# Patient Record
Sex: FEMALE | Race: WHITE | NOT HISPANIC OR LATINO | URBAN - METROPOLITAN AREA
[De-identification: names, ages, dates, MRNs, and addresses within clinical notes are randomized per-mention and may not be internally consistent; named-entity substitution may affect disease eponyms.]

---

## 2019-07-20 ENCOUNTER — EMERGENCY (EMERGENCY)
Facility: HOSPITAL | Age: 49
LOS: 1 days | Discharge: HOME | End: 2019-07-20
Attending: EMERGENCY MEDICINE | Admitting: EMERGENCY MEDICINE
Payer: COMMERCIAL

## 2019-07-20 VITALS
WEIGHT: 139.99 LBS | TEMPERATURE: 98 F | DIASTOLIC BLOOD PRESSURE: 125 MMHG | HEART RATE: 93 BPM | RESPIRATION RATE: 16 BRPM | SYSTOLIC BLOOD PRESSURE: 182 MMHG | OXYGEN SATURATION: 98 %

## 2019-07-20 VITALS
RESPIRATION RATE: 16 BRPM | HEART RATE: 84 BPM | SYSTOLIC BLOOD PRESSURE: 152 MMHG | DIASTOLIC BLOOD PRESSURE: 88 MMHG | TEMPERATURE: 98 F | OXYGEN SATURATION: 99 %

## 2019-07-20 LAB
ALBUMIN SERPL ELPH-MCNC: 5 G/DL — SIGNIFICANT CHANGE UP (ref 3.3–5)
ALP SERPL-CCNC: 28 U/L — LOW (ref 40–120)
ALT FLD-CCNC: 34 U/L — SIGNIFICANT CHANGE UP (ref 10–45)
ANION GAP SERPL CALC-SCNC: 12 MMOL/L — SIGNIFICANT CHANGE UP (ref 5–17)
APPEARANCE UR: CLEAR — SIGNIFICANT CHANGE UP
AST SERPL-CCNC: 27 U/L — SIGNIFICANT CHANGE UP (ref 10–40)
BASOPHILS # BLD AUTO: 0.05 K/UL — SIGNIFICANT CHANGE UP (ref 0–0.2)
BASOPHILS NFR BLD AUTO: 0.7 % — SIGNIFICANT CHANGE UP (ref 0–2)
BILIRUB SERPL-MCNC: 1 MG/DL — SIGNIFICANT CHANGE UP (ref 0.2–1.2)
BILIRUB UR-MCNC: NEGATIVE — SIGNIFICANT CHANGE UP
BUN SERPL-MCNC: 15 MG/DL — SIGNIFICANT CHANGE UP (ref 7–23)
CALCIUM SERPL-MCNC: 10.1 MG/DL — SIGNIFICANT CHANGE UP (ref 8.4–10.5)
CHLORIDE SERPL-SCNC: 100 MMOL/L — SIGNIFICANT CHANGE UP (ref 96–108)
CO2 SERPL-SCNC: 26 MMOL/L — SIGNIFICANT CHANGE UP (ref 22–31)
COLOR SPEC: YELLOW — SIGNIFICANT CHANGE UP
CREAT SERPL-MCNC: 0.7 MG/DL — SIGNIFICANT CHANGE UP (ref 0.5–1.3)
DIFF PNL FLD: NEGATIVE — SIGNIFICANT CHANGE UP
EOSINOPHIL # BLD AUTO: 0.24 K/UL — SIGNIFICANT CHANGE UP (ref 0–0.5)
EOSINOPHIL NFR BLD AUTO: 3.3 % — SIGNIFICANT CHANGE UP (ref 0–6)
EXTRA BLUE TOP TUBE: SIGNIFICANT CHANGE UP
EXTRA SST TUBE: SIGNIFICANT CHANGE UP
GLUCOSE SERPL-MCNC: 109 MG/DL — HIGH (ref 70–99)
GLUCOSE UR QL: NEGATIVE — SIGNIFICANT CHANGE UP
HCT VFR BLD CALC: 42.9 % — SIGNIFICANT CHANGE UP (ref 34.5–45)
HGB BLD-MCNC: 14.7 G/DL — SIGNIFICANT CHANGE UP (ref 11.5–15.5)
IMM GRANULOCYTES NFR BLD AUTO: 0.4 % — SIGNIFICANT CHANGE UP (ref 0–1.5)
KETONES UR-MCNC: NEGATIVE — SIGNIFICANT CHANGE UP
LEUKOCYTE ESTERASE UR-ACNC: NEGATIVE — SIGNIFICANT CHANGE UP
LYMPHOCYTES # BLD AUTO: 1.04 K/UL — SIGNIFICANT CHANGE UP (ref 1–3.3)
LYMPHOCYTES # BLD AUTO: 14.3 % — SIGNIFICANT CHANGE UP (ref 13–44)
MCHC RBC-ENTMCNC: 32.1 PG — SIGNIFICANT CHANGE UP (ref 27–34)
MCHC RBC-ENTMCNC: 34.3 GM/DL — SIGNIFICANT CHANGE UP (ref 32–36)
MCV RBC AUTO: 93.7 FL — SIGNIFICANT CHANGE UP (ref 80–100)
MONOCYTES # BLD AUTO: 0.49 K/UL — SIGNIFICANT CHANGE UP (ref 0–0.9)
MONOCYTES NFR BLD AUTO: 6.7 % — SIGNIFICANT CHANGE UP (ref 2–14)
NEUTROPHILS # BLD AUTO: 5.44 K/UL — SIGNIFICANT CHANGE UP (ref 1.8–7.4)
NEUTROPHILS NFR BLD AUTO: 74.6 % — SIGNIFICANT CHANGE UP (ref 43–77)
NITRITE UR-MCNC: NEGATIVE — SIGNIFICANT CHANGE UP
NRBC # BLD: 0 /100 WBCS — SIGNIFICANT CHANGE UP (ref 0–0)
PH UR: 7 — SIGNIFICANT CHANGE UP (ref 5–8)
PLATELET # BLD AUTO: 258 K/UL — SIGNIFICANT CHANGE UP (ref 150–400)
POTASSIUM SERPL-MCNC: 4 MMOL/L — SIGNIFICANT CHANGE UP (ref 3.5–5.3)
POTASSIUM SERPL-SCNC: 4 MMOL/L — SIGNIFICANT CHANGE UP (ref 3.5–5.3)
PROT SERPL-MCNC: 8 G/DL — SIGNIFICANT CHANGE UP (ref 6–8.3)
PROT UR-MCNC: NEGATIVE MG/DL — SIGNIFICANT CHANGE UP
RBC # BLD: 4.58 M/UL — SIGNIFICANT CHANGE UP (ref 3.8–5.2)
RBC # FLD: 12.1 % — SIGNIFICANT CHANGE UP (ref 10.3–14.5)
SODIUM SERPL-SCNC: 138 MMOL/L — SIGNIFICANT CHANGE UP (ref 135–145)
SP GR SPEC: 1.01 — SIGNIFICANT CHANGE UP (ref 1–1.03)
UROBILINOGEN FLD QL: 0.2 E.U./DL — SIGNIFICANT CHANGE UP
WBC # BLD: 7.29 K/UL — SIGNIFICANT CHANGE UP (ref 3.8–10.5)
WBC # FLD AUTO: 7.29 K/UL — SIGNIFICANT CHANGE UP (ref 3.8–10.5)

## 2019-07-20 PROCEDURE — 80053 COMPREHEN METABOLIC PANEL: CPT

## 2019-07-20 PROCEDURE — 85025 COMPLETE CBC W/AUTO DIFF WBC: CPT

## 2019-07-20 PROCEDURE — 83735 ASSAY OF MAGNESIUM: CPT

## 2019-07-20 PROCEDURE — 71046 X-RAY EXAM CHEST 2 VIEWS: CPT

## 2019-07-20 PROCEDURE — 84484 ASSAY OF TROPONIN QUANT: CPT

## 2019-07-20 PROCEDURE — 93005 ELECTROCARDIOGRAM TRACING: CPT

## 2019-07-20 PROCEDURE — 99284 EMERGENCY DEPT VISIT MOD MDM: CPT | Mod: 25

## 2019-07-20 PROCEDURE — 36415 COLL VENOUS BLD VENIPUNCTURE: CPT

## 2019-07-20 PROCEDURE — 81003 URINALYSIS AUTO W/O SCOPE: CPT

## 2019-07-20 PROCEDURE — 71046 X-RAY EXAM CHEST 2 VIEWS: CPT | Mod: 26

## 2019-07-20 PROCEDURE — 84443 ASSAY THYROID STIM HORMONE: CPT

## 2019-07-20 PROCEDURE — 93010 ELECTROCARDIOGRAM REPORT: CPT | Mod: NC

## 2019-07-20 RX ORDER — SODIUM CHLORIDE 9 MG/ML
1000 INJECTION INTRAMUSCULAR; INTRAVENOUS; SUBCUTANEOUS ONCE
Refills: 0 | Status: COMPLETED | OUTPATIENT
Start: 2019-07-20 | End: 2019-07-20

## 2019-07-20 RX ADMIN — SODIUM CHLORIDE 1000 MILLILITER(S): 9 INJECTION INTRAMUSCULAR; INTRAVENOUS; SUBCUTANEOUS at 13:15

## 2019-07-20 NOTE — ED PROVIDER NOTE - CLINICAL SUMMARY MEDICAL DECISION MAKING FREE TEXT BOX
Symptoms likely due to humid environment and hyperventilation.  Low suspicion for VTE and ACS.  Markedly elevated BP improved.  Normal EKG and CXR.  No significant lab abnormalities.  Resolved in cool environment with hydration.

## 2019-07-20 NOTE — ED ADULT TRIAGE NOTE - OTHER COMPLAINTS
Patient states she walked from [facility] to [facility]. She was outside for about 1 hour. In [facility] she felt hot, dizzy and numbness in b/l hands and feet. Currently, dizziness has resolved but states tingling in b/l hands and feet. She denies CP or SOB.

## 2019-07-20 NOTE — ED ADULT NURSE NOTE - OBJECTIVE STATEMENT
pt to ER via EMS w/ report of becoming overheated while walking in [place] with sudden onset of weakness, dizziness, slight sob, diaphoresis, and tingling to hands and feet.  Pt denies LOC but states she felt like she was going to pass out.  Pt denies cp/f/c/n/v.  BP noted to be high in triage w/ slight tachycardia. 12 lead ekg done and shown to ER attending physician.  Pt maintained on CM. IV access established, labs drawn and sent. BP improved on remeasurement.  Will continue to monitor.

## 2019-07-20 NOTE — ED PROVIDER NOTE - NSFOLLOWUPINSTRUCTIONS_ED_ALL_ED_FT
Rest and stay well-hydrated in a cool, air-conditioned environment.  Follow up with your doctor after the weekend.  Return to the Emergency Department if you have any new or worsening symptoms, or if you have any concerns.  __________________________________________________    HEAT EXHAUSTION - AfterCare(R) Instructions(ER/ED)     Heat Exhaustion    WHAT YOU NEED TO KNOW:    Heat exhaustion is when your body overheats. Normally, the body has a cooling system that is controlled by the brain. The cooling system adjusts to hot conditions and lowers your body temperature by producing sweat. With heat exhaustion, the body's cooling system is not working well and results in an increased body temperature.    DISCHARGE INSTRUCTIONS:    Call 911 for any of the following:     You have trouble breathing.      You are confused or cannot think clearly.      You cannot move your arms and legs.    Return to the emergency department if:     You cannot stop vomiting.        Contact your healthcare provider if:     Your signs and symptoms do not improve with treatment.      You have numbness or prickling feeling in your arms or legs.      You have questions or concerns about your condition or care.    First aid for heat exhaustion:     Move to an air-conditioned location or a cool, shady area and lie down. Raise your legs above the level of your heart.      Drink cold liquid, such as water or a sports drink.      Mist yourself with cold water or pour cool water on your head, neck, and clothes.      Loosen or remove as many clothes as possible.      If you do not feel better in 1 hour, go to the emergency department.    Prevent heat exhaustion:     Wear lightweight, loose, and light-colored clothing.       Protect your head and neck with a hat or umbrella when you are outdoors.      Drink lots of water or sports drinks. Avoid alcohol.       Eat salty foods, such as salted crackers, and salted pretzels.      Limit your activities during the hottest time of the day. This is usually late morning through early afternoon.      Use air conditioners or fans and have enough proper ventilation. If there is no air conditioning available, keep your windows open so air can circulate.    Follow up with your healthcare provider as directed: Write down your questions so you remember to ask them during your visits.

## 2019-07-20 NOTE — ED PROVIDER NOTE - NS ED ROS FT
CONSTITUTIONAL: No fever/chills  NEURO: No headache, no dizziness, no syncope  EYES: No visual changes  ENT: No rhinorrhea or sore throat  PULM: No cough or hemoptysis  CV: No chest pain or palpitations  GI: No abdominal pain, vomiting, or diarrhea  : No dysuria, hematuria, frequency  MSK: No neck pain or back pain, no joint pain  SKIN: no rash or unusual bruising

## 2019-07-20 NOTE — ED ADULT NURSE NOTE - OTHER COMPLAINTS
Patient states she walked from [facility] to [facility]. She was outside for about 1 hour. In [facility] she felt hot, dizzy and numbness in b/l hands and feet. Currently, dizziness has resolved but states tingling in b/l hands and feet. She denies CP or SOB.

## 2019-07-20 NOTE — ED PROVIDER NOTE - ATTENDING CONTRIBUTION TO CARE
50 yo fem PMHx HLD, remote hx stage 1 breast ca tx by mastectomy 19 yrs ago, HTN BIBA c/o "heat exhaustion".  She is visiting [facility] with , had just walked apx 5-10 min in high heat & humidity (heat index over 105) from [address] [place] [address] to [facility] when she suddenly began to feel week and short of breath, with tingling in hands and feet.  She thought she was going to faint, but no LOC.   flagged [facility] who called for EMS.  She is feeling better now. There was no CP/palpitations.  No NVD but she admits to heavy alcohol consumption yesterday.  Her BP is being watched for the past year as she is working to lose weight, and she is not yet on BP medication.  Cancer-free for 19 yrs.  No hx of VTE, no recent immobilization, no estrogen use.  Past smoker, quit 12 yrs ago.  Never cocaine or amphetamine use.   No hx of DM or CAD.  Family hx of CAD in father who had CABG in his 40s.    HPI and PE as per PA    Pt very well appearing, labs, cxr, ekg  No acute findings  Received IVFs and feels much better in ED  Ambulating around ED without difficulty.

## 2019-07-20 NOTE — ED PROVIDER NOTE - OBJECTIVE STATEMENT
50 yo fem PMHx HLD, remote hx stage 1 breast ca tx by mastectomy 19 yrs ago, HTN BIBA c/o "heat exhaustion".  She is visiting [facility] with , had just walked apx 5-10 min in high heat & humidity (heat index over 105) from [address] [place] [address] to [facility] when she suddenly began to feel week and short of breath, with tingling in hands and feet.  She thought she was going to faint, but no LOC.   flagged [facility] who called for EMS.  She is feeling better now. There was no CP/palpitations.  No NVD but she admits to heavy alcohol consumption yesterday.  Her BP is being watched for the past year as she is working to lose weight, and she is not yet on BP medication.  Cancer-free for 19 yrs.  No hx of VTE, no recent immobilization, no estrogen use.  Past smoker, quit 12 yrs ago.  Never cocaine or amphetamine use.   No hx of DM or CAD.  Family hx of CAD in father who had CABG in his 40s.

## 2019-07-20 NOTE — ED PROVIDER NOTE - PROGRESS NOTE DETAILS
Feels better, asymptomatic now.  Patient will go back to hotel and rest in air conditioned environment with .  Advised to avoid alcohol and caffeine, and to stay well-hydrated.  She will follow up with Primary Care after weekend. Return precautions given.

## 2019-07-24 DIAGNOSIS — Z87.891 PERSONAL HISTORY OF NICOTINE DEPENDENCE: ICD-10-CM

## 2019-07-24 DIAGNOSIS — T67.5XXA HEAT EXHAUSTION, UNSPECIFIED, INITIAL ENCOUNTER: ICD-10-CM
